# Patient Record
Sex: FEMALE | Race: BLACK OR AFRICAN AMERICAN | NOT HISPANIC OR LATINO | Employment: FULL TIME | URBAN - METROPOLITAN AREA
[De-identification: names, ages, dates, MRNs, and addresses within clinical notes are randomized per-mention and may not be internally consistent; named-entity substitution may affect disease eponyms.]

---

## 2024-06-17 ENCOUNTER — OFFICE VISIT (OUTPATIENT)
Dept: URGENT CARE | Facility: CLINIC | Age: 19
End: 2024-06-17
Payer: COMMERCIAL

## 2024-06-17 VITALS
BODY MASS INDEX: 25.16 KG/M2 | TEMPERATURE: 98 F | RESPIRATION RATE: 18 BRPM | DIASTOLIC BLOOD PRESSURE: 88 MMHG | WEIGHT: 151 LBS | HEART RATE: 85 BPM | OXYGEN SATURATION: 98 % | SYSTOLIC BLOOD PRESSURE: 125 MMHG | HEIGHT: 65 IN

## 2024-06-17 DIAGNOSIS — R50.9 FEVER, UNSPECIFIED FEVER CAUSE: ICD-10-CM

## 2024-06-17 DIAGNOSIS — R09.81 NASAL CONGESTION: ICD-10-CM

## 2024-06-17 DIAGNOSIS — B97.89 VIRAL RESPIRATORY ILLNESS: ICD-10-CM

## 2024-06-17 DIAGNOSIS — J06.9 UPPER RESPIRATORY INFECTION WITH COUGH AND CONGESTION: Primary | ICD-10-CM

## 2024-06-17 DIAGNOSIS — J98.8 VIRAL RESPIRATORY ILLNESS: ICD-10-CM

## 2024-06-17 LAB
CTP QC/QA: YES
CTP QC/QA: YES
POC MOLECULAR INFLUENZA A AGN: NEGATIVE
POC MOLECULAR INFLUENZA B AGN: NEGATIVE
SARS-COV-2 AG RESP QL IA.RAPID: NEGATIVE

## 2024-06-17 PROCEDURE — 87811 SARS-COV-2 COVID19 W/OPTIC: CPT | Mod: QW,S$GLB,, | Performed by: NURSE PRACTITIONER

## 2024-06-17 PROCEDURE — 87502 INFLUENZA DNA AMP PROBE: CPT | Mod: QW,,, | Performed by: NURSE PRACTITIONER

## 2024-06-17 PROCEDURE — 99213 OFFICE O/P EST LOW 20 MIN: CPT | Mod: S$GLB,,, | Performed by: NURSE PRACTITIONER

## 2024-06-17 RX ORDER — FLUTICASONE PROPIONATE 50 MCG
1 SPRAY, SUSPENSION (ML) NASAL DAILY
Qty: 9.9 ML | Refills: 0 | Status: SHIPPED | OUTPATIENT
Start: 2024-06-17

## 2024-06-17 RX ORDER — BROMPHENIRAMINE MALEATE, PSEUDOEPHEDRINE HYDROCHLORIDE, AND DEXTROMETHORPHAN HYDROBROMIDE 2; 30; 10 MG/5ML; MG/5ML; MG/5ML
10 SYRUP ORAL EVERY 4 HOURS PRN
Qty: 120 ML | Refills: 0 | Status: SHIPPED | OUTPATIENT
Start: 2024-06-17

## 2024-06-17 RX ORDER — AZELASTINE 1 MG/ML
1 SPRAY, METERED NASAL 2 TIMES DAILY
Qty: 30 ML | Refills: 0 | Status: SHIPPED | OUTPATIENT
Start: 2024-06-17 | End: 2025-06-17

## 2024-06-17 NOTE — PROGRESS NOTES
"Subjective:      Patient ID: Gunnar Onofre is a 19 y.o. female.    Vitals:  height is 5' 5" (1.651 m) and weight is 68.5 kg (151 lb). Her oral temperature is 97.7 °F (36.5 °C). Her blood pressure is 125/88 and her pulse is 85. Her respiration is 18 and oxygen saturation is 98%.     Chief Complaint: Nasal Congestion    Pt presents with congestion and cough x 3 days. States she had a fever yesterday while at work. Temp was 101. She has been taking mucinex and thera flu with no relief.    Other  This is a new problem. The current episode started in the past 7 days. The problem occurs constantly. The problem has been gradually worsening. Associated symptoms include chills, congestion, coughing, diaphoresis, fatigue, a fever, headaches, myalgias and a sore throat. Pertinent negatives include no abdominal pain, chest pain, nausea, neck pain, rash, vertigo or vomiting.       Constitution: Positive for chills, sweating, fatigue and fever. Negative for activity change and appetite change.   HENT:  Positive for congestion, nosebleeds, postnasal drip, sinus pressure and sore throat. Negative for ear pain.    Neck: Negative for neck pain, neck stiffness and painful lymph nodes.   Cardiovascular:  Negative for chest pain, leg swelling and palpitations.   Eyes:  Positive for eye itching.   Respiratory:  Positive for cough. Negative for sputum production, shortness of breath, wheezing and asthma.    Gastrointestinal:  Negative for abdominal pain, nausea, vomiting and diarrhea.   Musculoskeletal:  Positive for muscle ache.   Skin:  Negative for color change, pale, rash and wound.   Allergic/Immunologic: Negative for asthma.   Neurological:  Positive for headaches. Negative for dizziness, history of vertigo, light-headedness, disorientation and altered mental status.   Hematologic/Lymphatic: Negative for swollen lymph nodes.   Psychiatric/Behavioral:  Negative for altered mental status, disorientation and confusion.     "   Objective:     Physical Exam   Constitutional: She is oriented to person, place, and time. She appears well-developed. She is cooperative.  Non-toxic appearance. She does not appear ill. No distress.   HENT:   Head: Normocephalic and atraumatic.   Ears:   Right Ear: Hearing, external ear and ear canal normal. Tympanic membrane is not perforated, not erythematous, not retracted and not bulging. A middle ear effusion is present.   Left Ear: Hearing, external ear and ear canal normal. Tympanic membrane is not perforated, not erythematous, not retracted and not bulging. A middle ear effusion is present.   Nose: Mucosal edema and rhinorrhea present. No nasal deformity. No epistaxis. Right sinus exhibits no maxillary sinus tenderness and no frontal sinus tenderness. Left sinus exhibits no maxillary sinus tenderness and no frontal sinus tenderness.   Mouth/Throat: Uvula is midline and mucous membranes are normal. No trismus in the jaw. Normal dentition. No uvula swelling. Posterior oropharyngeal erythema and cobblestoning present. No oropharyngeal exudate or posterior oropharyngeal edema. No tonsillar exudate.   Eyes: Conjunctivae and lids are normal. No scleral icterus.   Neck: Trachea normal and phonation normal. Neck supple. No edema present. No erythema present. No neck rigidity present.   Cardiovascular: Normal rate, regular rhythm, normal heart sounds and normal pulses.   Pulmonary/Chest: Effort normal and breath sounds normal. No respiratory distress. She has no decreased breath sounds. She has no rhonchi.   Abdominal: Normal appearance.   Musculoskeletal: Normal range of motion.         General: No deformity. Normal range of motion.   Lymphadenopathy:     She has cervical adenopathy.        Left cervical: Superficial cervical adenopathy present.   Neurological: She is alert and oriented to person, place, and time. She exhibits normal muscle tone. Coordination normal.   Skin: Skin is warm, dry, intact, not  diaphoretic and not pale.   Psychiatric: Her speech is normal and behavior is normal. Judgment and thought content normal.   Nursing note and vitals reviewed.      Assessment:     1. Upper respiratory infection with cough and congestion    2. Nasal congestion    3. Fever, unspecified fever cause    4. Viral respiratory illness        Plan:     Office Visit on 06/17/2024   Component Date Value Ref Range Status    SARS Coronavirus 2 Antigen 06/17/2024 Negative  Negative Final     Acceptable 06/17/2024 Yes   Final    POC Molecular Influenza A Ag 06/17/2024 Negative  Negative Final    POC Molecular Influenza B Ag 06/17/2024 Negative  Negative Final     Acceptable 06/17/2024 Yes   Final       Upper respiratory infection with cough and congestion  -     brompheniramine-pseudoeph-DM (BROMFED DM) 2-30-10 mg/5 mL Syrp; Take 10 mLs by mouth every 4 (four) hours as needed (cough/congestion).  Dispense: 120 mL; Refill: 0  -     azelastine (ASTELIN) 137 mcg (0.1 %) nasal spray; 1 spray (137 mcg total) by Nasal route 2 (two) times daily.  Dispense: 30 mL; Refill: 0  -     fluticasone propionate (FLONASE) 50 mcg/actuation nasal spray; 1 spray (50 mcg total) by Each Nostril route once daily.  Dispense: 9.9 mL; Refill: 0    Nasal congestion  -     SARS Coronavirus 2 Antigen, POCT Manual Read  -     POCT Influenza A/B MOLECULAR    Fever, unspecified fever cause    Viral respiratory illness  -     brompheniramine-pseudoeph-DM (BROMFED DM) 2-30-10 mg/5 mL Syrp; Take 10 mLs by mouth every 4 (four) hours as needed (cough/congestion).  Dispense: 120 mL; Refill: 0  -     azelastine (ASTELIN) 137 mcg (0.1 %) nasal spray; 1 spray (137 mcg total) by Nasal route 2 (two) times daily.  Dispense: 30 mL; Refill: 0  -     fluticasone propionate (FLONASE) 50 mcg/actuation nasal spray; 1 spray (50 mcg total) by Each Nostril route once daily.  Dispense: 9.9 mL; Refill: 0                Patient Instructions   Please follow  up with your primary care provider within 2-5 days if your signs and symptoms have not resolved or worsen.     If your condition worsens or fails to improve we recommend that you receive another evaluation at the emergency room immediately or contact your primary medical clinic to discuss your concerns.   You must understand that you have received an Urgent Care treatment only and that you may be released before all of your medical problems are known or treated. You, the patient, will arrange for follow up care as instructed.       You have tested negative for COVID on our Binax test. As a follow up the recommendation is if you have symptoms within the first 7 days to retest within 48 hours. I you have NO SYMPTONS then you should test every 48 hours two more times.  Alternate Tylenol/Motrin as needed for fever.  PLAIN Zyrtec once every evening.  OTC Edin Med sinus rinses.  Apply thin layer of OTC Aquaphor to inside of nostrils.  Use saline nasal spray to keep nasal passages moist.  Pinch and hold firm, steady pressure to nose for nosebleeds. Use OTC Afrin nasal spray (2 squirts) for no longer than 2 days ONLY if needed for uncontrolled nosebleeds.    OTC throat pain mixture Instructions    Combine the following ingredients:    10 ml of Maalox (Aluminum-magnesium hydroxide-simethicone 200-200-20 mg/5 ml) PLUS    10 ml of Tylenol (Acetaminophen 160 mg/5 ml) PLUS     5 ml of Benadryl (Diphenhydramine 12.5 mg/5 ml)     Take mixture as a single dose; gargle then swallow every 6 hours as needed for throat pain relief.  Refrigerate mixture for optimal comfort/pain relief.

## 2024-06-17 NOTE — LETTER
June 17, 2024      [place] [facility]  [facility] [facility] [place] [address]  Phone: [phone]  Fax: [phone]       Patient: Gunnar Onofre   YOB: 2005  Date of Visit: 06/17/2024    To Whom It May Concern:    Joyce Onofre  was at [facility] on 06/17/2024. The patient may return to work/school on 6/19/2024 with no restrictions. If you have any questions or concerns, or if I can be of further assistance, please do not hesitate to contact me.    Sincerely,    Farhana Riley NP

## 2024-06-17 NOTE — LETTER
June 17, 2024      [place] [facility]  [facility] [facility] [place] [address]  Phone: [phone]  Fax: [phone]       Patient: Gunnar Onofre   YOB: 2005  Date of Visit: 06/17/2024    To Whom It May Concern:    Joyce Onofre  was at [facility] on 06/17/2024. The patient may return to work/school on 6/18/2024 with no restrictions. If you have any questions or concerns, or if I can be of further assistance, please do not hesitate to contact me.    Sincerely,    Farhana Riley NP

## 2024-06-17 NOTE — PATIENT INSTRUCTIONS
Please follow up with your primary care provider within 2-5 days if your signs and symptoms have not resolved or worsen.     If your condition worsens or fails to improve we recommend that you receive another evaluation at the emergency room immediately or contact your primary medical clinic to discuss your concerns.   You must understand that you have received an Urgent Care treatment only and that you may be released before all of your medical problems are known or treated. You, the patient, will arrange for follow up care as instructed.       You have tested negative for COVID on our Binax test. As a follow up the recommendation is if you have symptoms within the first 7 days to retest within 48 hours. I you have NO SYMPTONS then you should test every 48 hours two more times.  Alternate Tylenol/Motrin as needed for fever.  PLAIN Zyrtec once every evening.  OTC Edin Med sinus rinses.  Apply thin layer of OTC Aquaphor to inside of nostrils.  Use saline nasal spray to keep nasal passages moist.  Pinch and hold firm, steady pressure to nose for nosebleeds. Use OTC Afrin nasal spray (2 squirts) for no longer than 2 days ONLY if needed for uncontrolled nosebleeds.    OTC throat pain mixture Instructions    Combine the following ingredients:    10 ml of Maalox (Aluminum-magnesium hydroxide-simethicone 200-200-20 mg/5 ml) PLUS    10 ml of Tylenol (Acetaminophen 160 mg/5 ml) PLUS     5 ml of Benadryl (Diphenhydramine 12.5 mg/5 ml)     Take mixture as a single dose; gargle then swallow every 6 hours as needed for throat pain relief.  Refrigerate mixture for optimal comfort/pain relief.

## 2024-10-18 ENCOUNTER — OFFICE VISIT (OUTPATIENT)
Dept: URGENT CARE | Facility: CLINIC | Age: 19
End: 2024-10-18
Payer: COMMERCIAL

## 2024-10-18 VITALS
RESPIRATION RATE: 18 BRPM | OXYGEN SATURATION: 100 % | HEART RATE: 87 BPM | DIASTOLIC BLOOD PRESSURE: 79 MMHG | TEMPERATURE: 98 F | WEIGHT: 147 LBS | BODY MASS INDEX: 23.63 KG/M2 | HEIGHT: 66 IN | SYSTOLIC BLOOD PRESSURE: 122 MMHG

## 2024-10-18 DIAGNOSIS — R19.7 DIARRHEA, UNSPECIFIED TYPE: ICD-10-CM

## 2024-10-18 DIAGNOSIS — R51.9 SINUS HEADACHE: ICD-10-CM

## 2024-10-18 DIAGNOSIS — J32.9 SINUSITIS, UNSPECIFIED CHRONICITY, UNSPECIFIED LOCATION: Primary | ICD-10-CM

## 2024-10-18 PROCEDURE — 99214 OFFICE O/P EST MOD 30 MIN: CPT | Mod: ,,,

## 2024-10-18 PROCEDURE — 96372 THER/PROPH/DIAG INJ SC/IM: CPT | Mod: ,,,

## 2024-10-18 RX ORDER — DOXYCYCLINE 100 MG/1
100 CAPSULE ORAL EVERY 12 HOURS
Qty: 14 CAPSULE | Refills: 0 | Status: SHIPPED | OUTPATIENT
Start: 2024-10-18 | End: 2024-10-25

## 2024-10-18 RX ORDER — KETOROLAC TROMETHAMINE 30 MG/ML
15 INJECTION, SOLUTION INTRAMUSCULAR; INTRAVENOUS
Status: COMPLETED | OUTPATIENT
Start: 2024-10-18 | End: 2024-10-18

## 2024-10-18 RX ADMIN — KETOROLAC TROMETHAMINE 15 MG: 30 INJECTION, SOLUTION INTRAMUSCULAR; INTRAVENOUS at 04:10

## 2024-10-18 NOTE — LETTER
October 18, 2024      [facility] [place] [address]  [facility] [address]  Phone: [phone]       Patient: Gunnar Onofre   YOB: 2005  Date of Visit: 10/18/2024    To Whom It May Concern:    Joyce Onofre  was at [facility] on 10/18/2024. The patient may return to work/school on 10/20/2024 with no restrictions. If you have any questions or concerns, or if I can be of further assistance, please do not hesitate to contact me.    Sincerely,    Moustapha Sanchez LPN

## 2024-10-18 NOTE — PATIENT INSTRUCTIONS
Diarrhea   Once your return to the stool collection kit, we will call in 2-3 days with the results   Increase fluid intake and monitor for signs of dehydration including dark colored urine, weakness, lethargy, dizziness, etc.   Get plenty of rest.   BRAT Diet: Begin eating a BRAT diet as tolerated (bananas, plain rice, apple sauce, plain toast).  Fever / Body Aches: Take OTC Tylenol as needed for discomfort   Diarrhea: Take OTC Imodium per package instructions as needed for non-bloody diarrhea.   Follow-up with your Primary Care Provider as needed.   Present to the nearest Emergency Department with any significant change or worsening symptoms.    Sinusitis   Do not start the antibiotics until you return the stool culture   Do not take any other NSAIDS (motrin, advil, aleve, naproxen ect for the next 24 hours) you may take tylenol as needed   Start taking an allergy pill daily such as claritin, zyrtec, allegrea or xyzal. Also start using a nasal steroid spray such as flonase or nasacort daily. If you are not being treated for high blood pressure, you can also take decongestant such as sudafed as needed. They can be purchased over the counter. If oral steroids were prescribed, start them tomorrow morning. Monitor for fever. Take tylenol/acetaminophen or ibuprofen as needed. Rest and hydrate. If symptoms persist or worsen, return to clinic or seek medical attention immediately.

## 2024-10-18 NOTE — PROGRESS NOTES
"Subjective:      Patient ID: Gunnar Onofre is a 19 y.o. female.    Vitals:  height is 5' 6" (1.676 m) and weight is 66.7 kg (147 lb). Her oral temperature is 98.3 °F (36.8 °C). Her blood pressure is 122/79 and her pulse is 87. Her respiration is 18 and oxygen saturation is 100%.     Chief Complaint: Migraine    Patient is a 19 y.o. female who presents to urgent care with complaints of sinus congestion, sinus pain and headache x 1.5 weeks, worse over the last 2 days. She reports chills at night over the last 2-3 days. Alleviating factors include theraflu, tylenol with mild amount of relief. She denies any cough, sore throat, ear pain, fever, n/v, cp, sob, or vision changes.     She also c/o diarrhea X 5 days. She denies any recent travel or sick contacts, blood or mucous in her stool, abdominal pain, vomiting, body aches, for fever.     Headache   Associated symptoms include sinus pressure. Pertinent negatives include no coughing, dizziness, fever, nausea, sore throat, vomiting or weakness. There is no history of migraine headaches.   Diarrhea   Associated symptoms include chills and headaches. Pertinent negatives include no coughing, fever or vomiting.     Constitution: Positive for chills and fatigue. Negative for fever.   HENT:  Positive for congestion, postnasal drip, sinus pain and sinus pressure. Negative for sore throat, trouble swallowing and voice change.    Eyes: Negative.    Respiratory:  Negative for cough, shortness of breath, wheezing and asthma.    Gastrointestinal:  Positive for diarrhea. Negative for nausea, vomiting and bright red blood in stool.   Genitourinary: Negative.    Allergic/Immunologic: Negative for asthma.   Neurological:  Positive for headaches. Negative for dizziness, speech difficulty, history of migraines and loss of consciousness.      Objective:     Physical Exam   Constitutional: She is oriented to person, place, and time. She appears well-developed. She is cooperative.  " Non-toxic appearance. She does not appear ill. No distress.   HENT:   Head: Normocephalic and atraumatic.   Ears:   Right Ear: Hearing and external ear normal.   Left Ear: Hearing and external ear normal.   Nose: Congestion present. Right sinus exhibits frontal sinus tenderness. Left sinus exhibits frontal sinus tenderness.   Mouth/Throat: Uvula is midline, oropharynx is clear and moist and mucous membranes are normal. Mucous membranes are moist. No trismus in the jaw. No uvula swelling. Oropharynx is clear.   Eyes: Conjunctivae and lids are normal.   Neck: Trachea normal and phonation normal. Neck supple. No edema present. No erythema present. No neck rigidity present.   Cardiovascular: Normal rate.   Pulmonary/Chest: Effort normal and breath sounds normal. No stridor. No respiratory distress. She has no decreased breath sounds. She has no wheezes. She has no rhonchi. She has no rales.   Abdominal: Normal appearance.   Neurological: no focal deficit. She is alert and oriented to person, place, and time. She displays facial symmetry. She exhibits normal muscle tone. Gait normal. GCS eye subscore is 4. GCS verbal subscore is 5. GCS motor subscore is 6.   Skin: Skin is warm, dry, intact, not diaphoretic and no rash. Capillary refill takes less than 2 seconds.   Psychiatric: Her speech is normal and behavior is normal. Mood normal.   Nursing note and vitals reviewed.      Assessment:     1. Sinusitis, unspecified chronicity, unspecified location    2. Sinus headache    3. Diarrhea, unspecified type        Plan:       Sinusitis, unspecified chronicity, unspecified location    Sinus headache    Diarrhea, unspecified type  -     Clostridium Diff Toxin, A & B, EIA  -     Stool Exam-Ova,Cysts,Parasites  -     GIARDIA/CRYPTOSPORIDIUM ANTIGEN  -     Stool Culture  -     Occult Blood, Stool Screening (1 -3)    Other orders  -     ketorolac injection 15 mg  -     doxycycline (MONODOX) 100 MG capsule; Take 1 capsule (100 mg  total) by mouth every 12 (twelve) hours. for 7 days  Dispense: 14 capsule; Refill: 0    Diarrhea   Once your return to the stool collection kit, we will call in 2-3 days with the results   Increase fluid intake and monitor for signs of dehydration including dark colored urine, weakness, lethargy, dizziness, etc.   Get plenty of rest.   BRAT Diet: Begin eating a BRAT diet as tolerated (bananas, plain rice, apple sauce, plain toast).  Fever / Body Aches: Take OTC Tylenol as needed for discomfort   Diarrhea: Take OTC Imodium per package instructions as needed for non-bloody diarrhea.   Follow-up with your Primary Care Provider as needed.   Present to the nearest Emergency Department with any significant change or worsening symptoms.    Sinusitis   Do not start the antibiotics until you return the stool culture   Do not take any other NSAIDS (motrin, advil, aleve, naproxen ect for the next 24 hours) you may take tylenol as needed   Start taking an allergy pill daily such as claritin, zyrtec, allegrea or xyzal. Also start using a nasal steroid spray such as flonase or nasacort daily. If you are not being treated for high blood pressure, you can also take decongestant such as sudafed as needed. They can be purchased over the counter. If oral steroids were prescribed, start them tomorrow morning. Monitor for fever. Take tylenol/acetaminophen or ibuprofen as needed. Rest and hydrate. If symptoms persist or worsen, return to clinic or seek medical attention immediately.

## 2024-11-30 ENCOUNTER — OFFICE VISIT (OUTPATIENT)
Dept: URGENT CARE | Facility: CLINIC | Age: 19
End: 2024-11-30
Payer: COMMERCIAL

## 2024-11-30 VITALS
SYSTOLIC BLOOD PRESSURE: 136 MMHG | HEIGHT: 65 IN | OXYGEN SATURATION: 100 % | TEMPERATURE: 99 F | DIASTOLIC BLOOD PRESSURE: 84 MMHG | HEART RATE: 80 BPM | RESPIRATION RATE: 16 BRPM | WEIGHT: 149.94 LBS | BODY MASS INDEX: 24.98 KG/M2

## 2024-11-30 DIAGNOSIS — G43.809 OTHER MIGRAINE WITHOUT STATUS MIGRAINOSUS, NOT INTRACTABLE: Primary | ICD-10-CM

## 2024-11-30 PROCEDURE — 99214 OFFICE O/P EST MOD 30 MIN: CPT | Mod: PBBFAC | Performed by: FAMILY MEDICINE

## 2024-11-30 PROCEDURE — 99214 OFFICE O/P EST MOD 30 MIN: CPT | Mod: S$PBB,,, | Performed by: FAMILY MEDICINE

## 2024-11-30 RX ORDER — SUMATRIPTAN 50 MG/1
50 TABLET, FILM COATED ORAL 2 TIMES DAILY PRN
Qty: 12 TABLET | Refills: 1 | Status: SHIPPED | OUTPATIENT
Start: 2024-11-30 | End: 2024-12-30

## 2024-11-30 NOTE — LETTER
November 30, 2024      [facility]  [address] [facility] [address]  Phone: [phone]       Patient: Gunnar Onofre   YOB: 2005  Date of Visit: 11/30/2024    To Whom It May Concern:    Joyce Onofre  was at [facility] on 11/30/2024. The patient may return to work/school on DEC 1 2024 with no restrictions. If you have any questions or concerns, or if I can be of further assistance, please do not hesitate to contact me.    Sincerely,    MANJIT MALAVE MD

## 2024-12-01 NOTE — PROGRESS NOTES
"Subjective:       Patient ID: Gunnar Onofre is a 19 y.o. female.    Chief Complaint: Migraine (X 2days)      Migraine       19-year-old female with migraine for 2 days.  Past medical history of migraines.  She does not have any migraine medicine at this point.  Review of Systems   Neurological:         As above         Objective:       Vital Signs  Temp: 98.6 °F (37 °C)  Pulse: 80  Resp: 16  SpO2: 100 %  BP: 136/84  Patient Position: Sitting  Pain Score:   8  Pain Loc: Head  Height and Weight  Height: 5' 5" (165.1 cm)  Weight: 68 kg (149 lb 14.6 oz)  BSA (Calculated - sq m): 1.77 sq meters  BMI (Calculated): 24.9  Weight in (lb) to have BMI = 25: 149.9]  Physical Exam  Vitals reviewed.   Constitutional:       Appearance: Normal appearance.   HENT:      Head: Normocephalic and atraumatic.   Eyes:      Extraocular Movements: Extraocular movements intact.      Conjunctiva/sclera: Conjunctivae normal.      Pupils: Pupils are equal, round, and reactive to light.   Cardiovascular:      Rate and Rhythm: Normal rate and regular rhythm.      Heart sounds: Normal heart sounds.   Pulmonary:      Breath sounds: Normal breath sounds.   Skin:     General: Skin is warm and dry.   Neurological:      General: No focal deficit present.      Mental Status: She is alert.   Psychiatric:         Mood and Affect: Mood and affect normal.         Speech: Speech normal.         Behavior: Behavior normal. Behavior is cooperative.         Thought Content: Thought content does not include homicidal or suicidal ideation.         Assessment:       Problem List Items Addressed This Visit    None  Visit Diagnoses       Other migraine without status migrainosus, not intractable    -  Primary    Relevant Medications    sumatriptan (IMITREX) 50 MG tablet            Plan:   Encouraged resting in a cool, dark, quiet room  Encouraged ibuprofen/acetaminophen as needed  ER precautions  Follow-up with PCP     "

## 2025-01-10 ENCOUNTER — OFFICE VISIT (OUTPATIENT)
Dept: URGENT CARE | Facility: CLINIC | Age: 20
End: 2025-01-10
Payer: COMMERCIAL

## 2025-01-10 VITALS
SYSTOLIC BLOOD PRESSURE: 125 MMHG | WEIGHT: 150.63 LBS | BODY MASS INDEX: 25.09 KG/M2 | TEMPERATURE: 99 F | OXYGEN SATURATION: 100 % | HEIGHT: 65 IN | DIASTOLIC BLOOD PRESSURE: 88 MMHG | HEART RATE: 95 BPM | RESPIRATION RATE: 20 BRPM

## 2025-01-10 DIAGNOSIS — G44.89 OTHER HEADACHE SYNDROME: Primary | ICD-10-CM

## 2025-01-10 RX ORDER — BUTALBITAL, ACETAMINOPHEN AND CAFFEINE 50; 325; 40 MG/1; MG/1; MG/1
1 TABLET ORAL EVERY 4 HOURS PRN
Qty: 30 TABLET | Refills: 0 | Status: SHIPPED | OUTPATIENT
Start: 2025-01-10 | End: 2025-02-09 | Stop reason: CLARIF

## 2025-01-10 RX ORDER — SUMATRIPTAN SUCCINATE 50 MG/1
50 TABLET ORAL ONCE
Qty: 1 TABLET | Refills: 0 | Status: SHIPPED | OUTPATIENT
Start: 2025-01-10 | End: 2025-01-10 | Stop reason: CLARIF

## 2025-01-10 RX ORDER — SUMATRIPTAN SUCCINATE 50 MG/1
50 TABLET ORAL 2 TIMES DAILY
Qty: 30 TABLET | Refills: 0 | Status: SHIPPED | OUTPATIENT
Start: 2025-01-10

## 2025-01-11 NOTE — PROGRESS NOTES
"Subjective:      Patient ID: Gunnar Onofre is a 19 y.o. female.    Vitals:  height is 5' 5" (1.651 m) and weight is 68.3 kg (150 lb 9.6 oz). Her oral temperature is 98.5 °F (36.9 °C). Her blood pressure is 125/88 and her pulse is 95. Her respiration is 20 and oxygen saturation is 100%.     Chief Complaint: Headache     Patient is a 19 y.o. female who presents to urgent care with complaints of headache x1 days. Alleviating factors include ibuprofen with mild amount of relief. Patient denies nausea, vomiting, fever. Pt has a history of chronic migraines.  Out of migraine medication, not sure of the name will see PCP on Monday        Neurological:  Positive for headaches and history of migraines. Negative for dizziness, light-headedness, passing out, facial drooping, speech difficulty, coordination disturbances, loss of balance, disorientation, altered mental status, loss of consciousness, numbness, tingling, seizures and tremors.   Psychiatric/Behavioral:  Negative for altered mental status and disorientation.       Objective:     Physical Exam   Constitutional: She is oriented to person, place, and time. She appears well-developed. She is cooperative.  Non-toxic appearance. She does not appear ill. No distress.   HENT:   Head: Normocephalic and atraumatic.   Ears:   Right Ear: Hearing, tympanic membrane, external ear and ear canal normal.   Left Ear: Hearing, tympanic membrane, external ear and ear canal normal.   Nose: Nose normal. No mucosal edema, rhinorrhea or nasal deformity. No epistaxis. Right sinus exhibits no maxillary sinus tenderness and no frontal sinus tenderness. Left sinus exhibits no maxillary sinus tenderness and no frontal sinus tenderness.   Mouth/Throat: Uvula is midline, oropharynx is clear and moist and mucous membranes are normal. No trismus in the jaw. Normal dentition. No uvula swelling. No oropharyngeal exudate, posterior oropharyngeal edema or posterior oropharyngeal erythema.   Eyes: " Conjunctivae and lids are normal. No scleral icterus.   Neck: Trachea normal and phonation normal. Neck supple. No edema present. No erythema present. No neck rigidity present.   Cardiovascular: Normal rate, regular rhythm, normal heart sounds and normal pulses.   Pulmonary/Chest: Effort normal and breath sounds normal. No respiratory distress. She has no decreased breath sounds. She has no rhonchi.   Abdominal: Normal appearance.   Musculoskeletal: Normal range of motion.         General: No deformity. Normal range of motion.   Neurological: no focal deficit. She is alert and oriented to person, place, and time. She exhibits normal muscle tone. Coordination normal.   Skin: Skin is warm, dry, intact, not diaphoretic and not pale.   Psychiatric: Her speech is normal and behavior is normal. Judgment and thought content normal.   Nursing note and vitals reviewed.    Previous History:     Review of patient's allergies indicates:  No Known Allergies    Past Medical History:   Diagnosis Date    Alopecia     Depression     Eczema     Migraines      Current Outpatient Medications   Medication Instructions    azelastine (ASTELIN) 137 mcg, Nasal, 2 times daily    brompheniramine-pseudoeph-DM (BROMFED DM) 2-30-10 mg/5 mL Syrp 10 mLs, Oral, Every 4 hours PRN    fluticasone propionate (FLONASE) 50 mcg, Each Nostril, Daily    sumatriptan (IMITREX) 50 mg, Oral, 2 times daily     Past Surgical History:   Procedure Laterality Date    NO PAST SURGERIES       Family History   Problem Relation Name Age of Onset    Brain cancer Mother      No Known Problems Father         Social History     Tobacco Use    Smoking status: Never    Smokeless tobacco: Never   Substance Use Topics    Alcohol use: Never    Drug use: Yes     Frequency: 7.0 times per week     Types: Marijuana     Assessment:     1. Other headache syndrome        Plan:   Take ibuprofen OTC for pain as directed  Take prescription Imitrex as directed for migraine  headaches  Follow-up with your primary care on Monday as scheduled and planned  Return here for concerns  Go to ER for worsening headache, vomiting, disorientation or for concerns.  Patient was unaware of the name of her migraine medication, attempts to contact her mother unsuccessful as stated by patient,  researched in previous medical records Imitrex 50 mg b.i.d. prn headache  Other headache syndrome  -     Discontinue: butalbital-acetaminophen-caffeine -40 mg (FIORICET, ESGIC) -40 mg per tablet; Take 1 tablet by mouth every 4 (four) hours as needed for Pain. (Patient not taking: Reported on 1/10/2025)  Dispense: 30 tablet; Refill: 0  -     Discontinue: sumatriptan (IMITREX) 50 MG tablet; Take 1 tablet (50 mg total) by mouth once. for 1 dose  Dispense: 1 tablet; Refill: 0  -     sumatriptan (IMITREX) 50 MG tablet; Take 1 tablet (50 mg total) by mouth 2 (two) times a day.  Dispense: 30 tablet; Refill: 0

## 2025-01-11 NOTE — PATIENT INSTRUCTIONS
Take ibuprofen OTC for pain as directed  Take prescription Imitrex as directed for migraine headaches  Follow-up with your primary care on Monday as scheduled and planned  Return here for concerns  Go to ER for worsening headache, vomiting, disorientation or for concerns.

## 2025-04-26 ENCOUNTER — OFFICE VISIT (OUTPATIENT)
Dept: URGENT CARE | Facility: CLINIC | Age: 20
End: 2025-04-26
Payer: COMMERCIAL

## 2025-04-26 VITALS
BODY MASS INDEX: 25.23 KG/M2 | DIASTOLIC BLOOD PRESSURE: 65 MMHG | HEIGHT: 66 IN | OXYGEN SATURATION: 100 % | HEART RATE: 79 BPM | SYSTOLIC BLOOD PRESSURE: 103 MMHG | RESPIRATION RATE: 18 BRPM | TEMPERATURE: 98 F | WEIGHT: 157 LBS

## 2025-04-26 DIAGNOSIS — J02.9 PHARYNGITIS, UNSPECIFIED ETIOLOGY: Primary | ICD-10-CM

## 2025-04-26 LAB
CTP QC/QA: YES
CTP QC/QA: YES
HETEROPH AB SER QL: NEGATIVE
MOLECULAR STREP A: NEGATIVE

## 2025-04-26 PROCEDURE — 99213 OFFICE O/P EST LOW 20 MIN: CPT | Mod: ,,,

## 2025-04-26 PROCEDURE — 86308 HETEROPHILE ANTIBODY SCREEN: CPT | Mod: QW,,,

## 2025-04-26 PROCEDURE — 87651 STREP A DNA AMP PROBE: CPT | Mod: QW,,,

## 2025-04-26 RX ORDER — PREDNISONE 20 MG/1
TABLET ORAL
Qty: 8 TABLET | Refills: 0 | Status: SHIPPED | OUTPATIENT
Start: 2025-04-26

## 2025-04-26 NOTE — PATIENT INSTRUCTIONS
Negative strep and mono  Drink plenty of fluids. Get plenty of rest.   Claritin, Zyrtec, or other over-the-counter antihistamine for runny nose, postnasal drip, nasal congestion.  Nasal spray such as Nasacort or Flonase for congestion.  Over-the-counter cough medication as needed and as directed.  Over-the-counter decongestants such as Sudafed, phenylephrine or pseudoephedrine.  Avoid these if you have a history of high blood pressure.  Warm saltwater gargles for sore throat.  Warm water with honey to help coat the throat.  Throat lozenges.  Chloraseptic spray for worsening sore throat.  Tylenol or ibuprofen as needed for sore throat and fever.  May alternate every 3 hours    Call or return to clinic as needed   Go to the ER with any significant change or worsening of symptoms.   Follow up with your primary care doctor.

## 2025-04-26 NOTE — PROGRESS NOTES
"Subjective:      Patient ID: Gunnar Onofre is a 20 y.o. female.    Vitals:  height is 5' 5.5" (1.664 m) and weight is 71.2 kg (157 lb). Her temperature is 98.3 °F (36.8 °C). Her blood pressure is 103/65 and her pulse is 79. Her respiration is 18 and oxygen saturation is 100%.     Chief Complaint: Sore Throat     Patient is a 20 y.o. female who presents to urgent care with complaints of sore throat, pain with swallowing present for 1 week now.  Was seen in another clinic in [place] with negative strep swab at that time.  Was told take over-the-counter medication with no relief.  Alleviating factors include theraflu with mild amount of relief.  Denies any significant cough, fever, body aches, chills, neck stiffness, rash, GI symptoms.  Tolerating secretions while in clinic.        ROS   Objective:     Physical Exam   Constitutional: She is oriented to person, place, and time. She appears well-developed. She is cooperative.  Non-toxic appearance. She does not appear ill. No distress.   HENT:   Head: Normocephalic and atraumatic.   Ears:   Right Ear: Hearing, tympanic membrane, external ear and ear canal normal.   Left Ear: Hearing, tympanic membrane, external ear and ear canal normal.   Nose: Nose normal. No mucosal edema, rhinorrhea or nasal deformity. No epistaxis. Right sinus exhibits no maxillary sinus tenderness and no frontal sinus tenderness. Left sinus exhibits no maxillary sinus tenderness and no frontal sinus tenderness.      Comments: Clear postnasal drip, cobblestoning  Mouth/Throat: Uvula is midline, oropharynx is clear and moist and mucous membranes are normal. Mucous membranes are moist. No trismus in the jaw. Normal dentition. No uvula swelling. No oropharyngeal exudate, posterior oropharyngeal edema or posterior oropharyngeal erythema.   Eyes: Conjunctivae and lids are normal. No scleral icterus.   Neck: Trachea normal and phonation normal. Neck supple. No edema present. No erythema present. No " neck rigidity present.   Cardiovascular: Normal rate, regular rhythm, normal heart sounds and normal pulses.   Pulmonary/Chest: Effort normal and breath sounds normal. No respiratory distress. She has no decreased breath sounds. She has no rhonchi.   Abdominal: Normal appearance.   Musculoskeletal: Normal range of motion.         General: No deformity. Normal range of motion.   Neurological: She is alert and oriented to person, place, and time. She exhibits normal muscle tone. Coordination normal.   Skin: Skin is warm, dry, intact, not diaphoretic and not pale.   Psychiatric: Her speech is normal and behavior is normal. Judgment and thought content normal.   Nursing note and vitals reviewed.      Assessment:     1. Pharyngitis, unspecified etiology        Plan:       Pharyngitis, unspecified etiology  -     POCT Strep A, Molecular  -     POCT Infectious mononucleosis antibody  -     predniSONE (DELTASONE) 20 MG tablet; One tablet orally twice daily for 3 days and then once daily for 2 days  Dispense: 8 tablet; Refill: 0      Negative strep and mono  Drink plenty of fluids. Get plenty of rest.   Claritin, Zyrtec, or other over-the-counter antihistamine for runny nose, postnasal drip, nasal congestion.  Nasal spray such as Nasacort or Flonase for congestion.  Over-the-counter cough medication as needed and as directed.  Over-the-counter decongestants such as Sudafed, phenylephrine or pseudoephedrine.  Avoid these if you have a history of high blood pressure.  Warm saltwater gargles for sore throat.  Warm water with honey to help coat the throat.  Throat lozenges.  Chloraseptic spray for worsening sore throat.  Tylenol or ibuprofen as needed for sore throat and fever.  May alternate every 3 hours    Call or return to clinic as needed   Go to the ER with any significant change or worsening of symptoms.   Follow up with your primary care doctor.

## 2025-04-26 NOTE — LETTER
April 26, 2025      [facility]  [address]  [place] [address]  Phone: [phone]  Fax: [phone]       Patient: Gunnar Onofre   YOB: 2005  Date of Visit: 04/26/2025    To Whom It May Concern:    Joyce Onfore  was at [facility] on 04/26/2025. She may return to work/school on 04/27/2025 with no restrictions. If you have any questions or concerns, or if I can be of further assistance, please do not hesitate to contact me.    Sincerely,    Mercedes Garg LPN

## 2025-07-03 ENCOUNTER — OFFICE VISIT (OUTPATIENT)
Dept: FAMILY MEDICINE | Facility: CLINIC | Age: 20
End: 2025-07-03
Payer: COMMERCIAL

## 2025-07-03 ENCOUNTER — TELEPHONE (OUTPATIENT)
Dept: FAMILY MEDICINE | Facility: CLINIC | Age: 20
End: 2025-07-03

## 2025-07-03 VITALS
WEIGHT: 153.81 LBS | OXYGEN SATURATION: 98 % | DIASTOLIC BLOOD PRESSURE: 67 MMHG | HEIGHT: 66 IN | RESPIRATION RATE: 18 BRPM | SYSTOLIC BLOOD PRESSURE: 102 MMHG | BODY MASS INDEX: 24.72 KG/M2 | HEART RATE: 103 BPM | TEMPERATURE: 98 F

## 2025-07-03 DIAGNOSIS — Z23 NEED FOR TDAP VACCINATION: ICD-10-CM

## 2025-07-03 DIAGNOSIS — Z11.4 ENCOUNTER FOR SCREENING FOR HIV: ICD-10-CM

## 2025-07-03 DIAGNOSIS — G43.009 MIGRAINE WITHOUT AURA AND WITHOUT STATUS MIGRAINOSUS, NOT INTRACTABLE: ICD-10-CM

## 2025-07-03 DIAGNOSIS — L30.9 ECZEMA, UNSPECIFIED TYPE: ICD-10-CM

## 2025-07-03 DIAGNOSIS — F32.A DEPRESSION, UNSPECIFIED DEPRESSION TYPE: ICD-10-CM

## 2025-07-03 DIAGNOSIS — Z13.1 DIABETES MELLITUS SCREENING: ICD-10-CM

## 2025-07-03 DIAGNOSIS — Z13.220 NEED FOR LIPID SCREENING: ICD-10-CM

## 2025-07-03 DIAGNOSIS — Z00.00 ANNUAL PHYSICAL EXAM: Primary | ICD-10-CM

## 2025-07-03 DIAGNOSIS — Z11.59 ENCOUNTER FOR HEPATITIS C SCREENING TEST FOR LOW RISK PATIENT: ICD-10-CM

## 2025-07-03 DIAGNOSIS — Z12.4 SCREENING FOR MALIGNANT NEOPLASM OF CERVIX: ICD-10-CM

## 2025-07-03 RX ORDER — BETAMETHASONE DIPROPIONATE 0.5 MG/G
CREAM TOPICAL 2 TIMES DAILY
Qty: 45 G | Refills: 0 | Status: SHIPPED | OUTPATIENT
Start: 2025-07-03

## 2025-07-03 RX ORDER — SUMATRIPTAN SUCCINATE 50 MG/1
50 TABLET ORAL ONCE
Qty: 30 TABLET | Refills: 0 | Status: SHIPPED | OUTPATIENT
Start: 2025-07-03 | End: 2025-07-05

## 2025-07-03 NOTE — TELEPHONE ENCOUNTER
Patient discussed referral to GYN discussed during visit. Stated she called her GYN to make appt and next available was not until september so she wanted to find somewhere that could get her in sooner

## 2025-07-03 NOTE — TELEPHONE ENCOUNTER
GYN referral faxed to [facility]  Psych referral faxed to Antonio Douglas NP  Derm referral faxed to Braxton Rojas MD

## 2025-07-03 NOTE — TELEPHONE ENCOUNTER
I have ordered the following. Please notify the patient.    Orders Placed This Encounter   Procedures    Ambulatory referral/consult to Obstetrics / Gynecology     Standing Status:   Future     Expected Date:   7/10/2025     Expiration Date:   8/3/2026     Referral Priority:   Routine     Referral Type:   Consultation     Referral Reason:   Specialty Services Required     Requested Specialty:   Obstetrics and Gynecology     Number of Visits Requested:   1

## 2025-07-03 NOTE — PROGRESS NOTES
"Subjective:      Patient ID: Gunnar Onofre is a 20 y.o.  female.    Chief Complaint: Establish Care/Wellness    Preventative Health: Patient amenable to Tdap vaccine. Patient amenable to labs. Patient will establish with OB-GYN for her well-woman exam.    Depression: Patient would like to be referred to Psychiatry.    Headaches: Patient taking Imitrex PRN.    Eczema: Located mainly to arms and stomach. She is using a moisturizer OTC. Patient would like to be referred to Dermatology.    FH: Mother had brain CA.    Review of Systems   Constitutional:  Negative for activity change, appetite change, chills, diaphoresis, fatigue, fever and unexpected weight change.   Eyes:  Negative for visual disturbance.   Respiratory:  Negative for apnea, cough, shortness of breath, wheezing and stridor.    Cardiovascular:  Negative for chest pain, palpitations and leg swelling.   Gastrointestinal:  Negative for abdominal pain, blood in stool, constipation, diarrhea, nausea and vomiting.   Genitourinary:  Negative for dysuria and hematuria.   Musculoskeletal:  Negative for arthralgias, back pain and myalgias.   Skin:  Negative for rash and wound.        +eczema   Neurological:  Positive for headaches. Negative for dizziness, syncope, weakness and numbness.   Psychiatric/Behavioral:  Positive for dysphoric mood. Negative for agitation, behavioral problems, confusion, decreased concentration, hallucinations, self-injury, sleep disturbance and suicidal ideas. The patient is not nervous/anxious and is not hyperactive.      Objective:   /67 (BP Location: Right arm, Patient Position: Sitting)   Pulse 103   Temp 98.4 °F (36.9 °C) (Oral)   Resp 18   Ht 5' 5.5" (1.664 m)   Wt 69.8 kg (153 lb 12.8 oz)   LMP  (LMP Unknown)   SpO2 98%   BMI 25.20 kg/m²     Physical Exam  Vitals and nursing note reviewed.   Constitutional:       General: She is not in acute distress.     Appearance: Normal appearance. She is not " ill-appearing or toxic-appearing.   HENT:      Head: Normocephalic and atraumatic.      Mouth/Throat:      Mouth: Mucous membranes are moist.      Pharynx: Oropharynx is clear.   Eyes:      Conjunctiva/sclera: Conjunctivae normal.   Cardiovascular:      Rate and Rhythm: Normal rate and regular rhythm.      Heart sounds: Normal heart sounds. No murmur heard.  Pulmonary:      Effort: Pulmonary effort is normal. No respiratory distress.      Breath sounds: Normal breath sounds.   Abdominal:      General: There is no distension.      Palpations: Abdomen is soft.      Tenderness: There is no abdominal tenderness.   Musculoskeletal:         General: No deformity.      Cervical back: Neck supple. No tenderness.      Right lower leg: No edema.      Left lower leg: No edema.   Lymphadenopathy:      Cervical: No cervical adenopathy.   Skin:     General: Skin is warm and dry.      Findings: No lesion or rash.   Neurological:      General: No focal deficit present.      Mental Status: She is alert.      Motor: No weakness.      Coordination: Coordination normal.   Psychiatric:         Mood and Affect: Mood normal.         Behavior: Behavior normal.         Thought Content: Thought content normal.         Judgment: Judgment normal.       Assessment/Plan:   1. Annual physical exam  -     CBC Auto Differential; Future; Expected date: 07/03/2025  -     Comprehensive Metabolic Panel; Future; Expected date: 07/03/2025  -     Hemoglobin A1C; Future; Expected date: 07/03/2025  -     Hepatitis C Antibody; Future; Expected date: 07/03/2025  -     HIV 1/2 Ag/Ab (4th Gen); Future; Expected date: 07/03/2025  -     Lipid Panel; Future; Expected date: 07/03/2025  -     TSH; Future; Expected date: 07/03/2025  -     Ambulatory referral/consult to Dermatology; Future; Expected date: 07/10/2025  -     Tdap (BOOSTRIX) vaccine injection 0.5 mL    2. Need for Tdap vaccination  -     Tdap (BOOSTRIX) vaccine injection 0.5 mL    3. Need for lipid  screening  -     Lipid Panel; Future; Expected date: 07/03/2025    4. Diabetes mellitus screening  -     Hemoglobin A1C; Future; Expected date: 07/03/2025    5. Encounter for screening for HIV  -     HIV 1/2 Ag/Ab (4th Gen); Future; Expected date: 07/03/2025    6. Encounter for hepatitis C screening test for low risk patient  -     Hepatitis C Antibody; Future; Expected date: 07/03/2025    7. Depression, unspecified depression type  Assessment & Plan:  - Stable.  - Patient requesting Psychiatry referral.    Orders:  -     Ambulatory referral/consult to Psychiatry; Future; Expected date: 07/10/2025    8. Migraine without aura and without status migrainosus, not intractable  Assessment & Plan:  - Stable.  - Continue Imitrex PRN.    Orders:  -     sumatriptan (IMITREX) 50 MG tablet; Take 1 tablet (50 mg total) by mouth once. If symptoms persist or return, may repeat dose after >=2 hours. Maximum dose: 100mg/dose; 200mg per 24 hours. for 1 dose  Dispense: 30 tablet; Refill: 0    9. Eczema, unspecified type  Assessment & Plan:  - Stable.  - Continue moisturizing cream OTC.    Orders:  -     Ambulatory referral/consult to Dermatology; Future; Expected date: 07/10/2025  -     betamethasone dipropionate 0.05 % cream; Apply topically 2 (two) times daily.  Dispense: 45 g; Refill: 0       Follow up in about 1 year (around 7/3/2026) for Wellness.

## 2025-07-03 NOTE — TELEPHONE ENCOUNTER
Copied from CRM #[id number]. Topic: General Inquiry - Patient Advice  >> Jul 3, 2025  9:24 AM Nancy wrote:  Who Called: Gunnar Onofre    Caller is requesting assistance/information from provider's office.    Symptoms (please be specific): NA   How long has patient had these symptoms:  NA  List of preferred pharmacies on file (remove unneeded): [unfilled]  If different, enter pharmacy into here including location and phone number: NA      Preferred Method of Contact: Phone Call  Patient's Preferred Phone Number on File: [phone]   Best Call Back Number, if different:  Additional Information: medical advice, pt called to discuss why appt today 7/3/25 was changed to 1: 40PM with NP instead of with MD Jensen @ 1PM today, pt stated she does not want to be seen by NP, only MD, ABL 2x to confirm/discuss no answer, please advise, thanks

## 2025-07-05 ENCOUNTER — OFFICE VISIT (OUTPATIENT)
Dept: URGENT CARE | Facility: CLINIC | Age: 20
End: 2025-07-05
Payer: COMMERCIAL

## 2025-07-05 VITALS
TEMPERATURE: 98 F | OXYGEN SATURATION: 99 % | DIASTOLIC BLOOD PRESSURE: 85 MMHG | HEART RATE: 72 BPM | RESPIRATION RATE: 20 BRPM | WEIGHT: 153.63 LBS | SYSTOLIC BLOOD PRESSURE: 128 MMHG | BODY MASS INDEX: 25.6 KG/M2 | HEIGHT: 65 IN

## 2025-07-05 DIAGNOSIS — Z11.3 SCREENING FOR STDS (SEXUALLY TRANSMITTED DISEASES): ICD-10-CM

## 2025-07-05 DIAGNOSIS — R10.2 VAGINAL PAIN: Primary | ICD-10-CM

## 2025-07-05 LAB
BILIRUB UR QL STRIP: NEGATIVE
GLUCOSE UR QL STRIP: NEGATIVE
KETONES UR QL STRIP: POSITIVE
LEUKOCYTE ESTERASE UR QL STRIP: NEGATIVE
PH, POC UA: 5.5
POC BLOOD, URINE: NEGATIVE
POC NITRATES, URINE: NEGATIVE
PROT UR QL STRIP: NEGATIVE
SP GR UR STRIP: 1.03 (ref 1–1.03)
UROBILINOGEN UR STRIP-ACNC: 0.2 (ref 0.1–1.1)

## 2025-07-05 PROCEDURE — 81003 URINALYSIS AUTO W/O SCOPE: CPT | Mod: PBBFAC

## 2025-07-05 PROCEDURE — 99213 OFFICE O/P EST LOW 20 MIN: CPT | Mod: S$PBB,,,

## 2025-07-05 PROCEDURE — 99214 OFFICE O/P EST MOD 30 MIN: CPT | Mod: PBBFAC

## 2025-07-05 PROCEDURE — 87591 N.GONORRHOEAE DNA AMP PROB: CPT

## 2025-07-05 NOTE — PROGRESS NOTES
"Subjective:       Patient ID: Gunnar Onofre is a 20 y.o. female.    Vitals:  height is 5' 5" (1.651 m) and weight is 69.7 kg (153 lb 9.6 oz). Her oral temperature is 98.4 °F (36.9 °C). Her blood pressure is 128/85 and her pulse is 72. Her respiration is 20 and oxygen saturation is 99%.     Chief Complaint: Vaginal Pain (Pt co vaginal burning when urinating. X 2 weeks.Pt denies discharge and vaginal odor. Poss exposure to STD )    20 year old  female complaining of  symptoms x 2 weeks, reports recently discovered establish male partner infidelity behaviors. Patient's last menstrual period was 06/28/2025 (exact date).      Vaginal Pain  The patient's pertinent negatives include no vaginal discharge. Associated symptoms include dysuria. Pertinent negatives include no frequency or urgency.       Genitourinary:  Positive for dysuria and vaginal pain. Negative for frequency, urgency, vaginal discharge, vaginal odor and painful intercourse.   Allergic/Immunologic: Negative for itching.       Objective:      Physical Exam   Constitutional: She is oriented to person, place, and time. She appears well-developed. She is cooperative. She is easily aroused.  Non-toxic appearance. She does not appear ill. normal and well-groomedawake  HENT:   Head: Normocephalic and atraumatic.   Eyes: Conjunctivae and lids are normal.   Pulmonary/Chest: Effort normal.   Abdominal: Normal appearance. Soft. flat abdomen   Genitourinary:         Comments: deferred     Neurological: She is alert, oriented to person, place, and time and easily aroused. GCS eye subscore is 4. GCS verbal subscore is 5. GCS motor subscore is 6.   Skin: Skin is not diaphoretic.   Psychiatric: Her behavior is normal. Her affect is tearful.   Nursing note and vitals reviewed.        Assessment:       1. Vaginal pain    2. Screening for STDs (sexually transmitted diseases)          Plan:     No concerns for UTI, STD screening is pending, staff will " contact patient with any abnormal findings, encouraged to adhere to safe sexual practices.    Vaginal pain  -     POCT Urinalysis, Dipstick, Automated, W/O Scope  -     Sexually-Transmitted Infections (STIs) Increased Risk Panel    Screening for STDs (sexually transmitted diseases)           Results for orders placed or performed in visit on 07/05/25   POCT Urinalysis, Dipstick, Automated, W/O Scope    Collection Time: 07/05/25  4:09 PM   Result Value Ref Range    POC Blood, Urine Negative Negative    POC Bilirubin, Urine Negative Negative    POC Urobilinogen, Urine 0.2 0.1 - 1.1    POC Ketones, Urine Positive (A) Negative    POC Protein, Urine Negative Negative    POC Nitrates, Urine Negative Negative    POC Glucose, Urine Negative Negative    pH, UA 5.5     POC Specific Gravity, Urine 1.030 (A) 1.003 - 1.029    POC Leukocytes, Urine Negative Negative

## 2025-07-07 LAB
C TRACH RRNA UR QL NAA+PROBE: NOT DETECTED
N GONORRHOEA DNA UR QL NAA+PROBE: NOT DETECTED
T VAGINALIS RRNA UR QL NAA+PROBE: NOT DETECTED

## 2025-08-05 ENCOUNTER — OFFICE VISIT (OUTPATIENT)
Dept: URGENT CARE | Facility: CLINIC | Age: 20
End: 2025-08-05
Payer: COMMERCIAL

## 2025-08-05 VITALS
HEART RATE: 86 BPM | WEIGHT: 153 LBS | HEIGHT: 65 IN | SYSTOLIC BLOOD PRESSURE: 107 MMHG | BODY MASS INDEX: 25.49 KG/M2 | TEMPERATURE: 98 F | RESPIRATION RATE: 17 BRPM | DIASTOLIC BLOOD PRESSURE: 71 MMHG

## 2025-08-05 DIAGNOSIS — G43.809 OTHER MIGRAINE WITHOUT STATUS MIGRAINOSUS, NOT INTRACTABLE: Primary | ICD-10-CM

## 2025-08-05 PROCEDURE — 99213 OFFICE O/P EST LOW 20 MIN: CPT | Mod: ,,, | Performed by: FAMILY MEDICINE

## 2025-08-05 RX ORDER — ARIPIPRAZOLE 5 MG/1
5 TABLET ORAL NIGHTLY
COMMUNITY
Start: 2025-07-10

## 2025-08-05 RX ORDER — SUMATRIPTAN SUCCINATE 50 MG/1
TABLET ORAL
Qty: 30 TABLET | Refills: 0 | Status: SHIPPED | OUTPATIENT
Start: 2025-08-05

## 2025-08-05 NOTE — LETTER
August 5, 2025      [facility]  [address]  [place] [address]  Phone: [phone]  Fax: [phone]       Patient: Gunnar Onofre   YOB: 2005  Date of Visit: 08/05/2025    To Whom It May Concern:    Joyce Onofre  was at [facility] on 08/05/2025. She may return to work/school on 08/06/2025 with no restrictions. If you have any questions or concerns, or if I can be of further assistance, please do not hesitate to contact me.    Sincerely,    Mercedes Garg LPN

## 2025-08-05 NOTE — PATIENT INSTRUCTIONS
Medication sent to pharmacy take as prescribed  Rest in a quiet dark room until your symptoms are improved. Avoid straining your eyes by watching TV or looking at electronic devices.  Put a cold moist cloth or cold pack on the painful areas of your head for 10-20 minutes at a time, make sure to put a thin cloth in between the cold pack and your skin.  Do not ignore new symptoms that occur with a headache such as fever, weakness or numbness, visual changes or confusion.  If these develop please seek immediate medical attention.  Recommend to keep a headache diary so you can identify triggers of your headaches.  Get plenty of sleep and exercise, eat a healthy diet.  Call or seek immediate medical attention if you develop sudden numbness, paralysis, or weakness in your face arm or leg, sudden visual changes, trouble speaking, confusion or trouble understanding, sudden problems with walking or balance, a sudden severe headache that is different from your past headaches, you develop fever or stiff neck, or if you have any new or other worrisome symptoms that arise at home.

## 2025-08-05 NOTE — PROGRESS NOTES
"Subjective:      Patient ID: Gunnar Onofre is a 20 y.o. female.    Vitals:  height is 5' 5" (1.651 m) and weight is 69.4 kg (153 lb). Her temperature is 98.2 °F (36.8 °C). Her blood pressure is 107/71 and her pulse is 86. Her respiration is 17 and oxygen saturation is 100% (pended).     Chief Complaint: Migraine     Patient is a 20 y.o. female who presents to urgent care with complaints of migraine since last night with light sensitivy. Patient denies fever, sore throat, cough or congestion.  She was seen by her primary care 1 month ago and a refill for sumatriptan was given, however she was unaware that this refills was sent to the pharmacy and she never picked it up.     Migraine   Pertinent negatives include no dizziness, fever, numbness, seizures or weakness. Her past medical history is significant for migraine headaches.     Constitution: Negative for chills, sweating, fatigue and fever.   HENT: Negative.     Respiratory: Negative.     Musculoskeletal: Negative.    Neurological:  Positive for headaches and history of migraines. Negative for dizziness, history of vertigo, light-headedness, passing out, facial drooping, loss of consciousness, numbness, tingling and seizures.   Hematologic/Lymphatic: Negative.       Objective:     Physical Exam   Constitutional: She is oriented to person, place, and time. She appears well-developed.  Non-toxic appearance. She does not appear ill. No distress.   HENT:   Head: Normocephalic and atraumatic.   Ears:   Right Ear: Hearing, tympanic membrane, external ear and ear canal normal.   Left Ear: Hearing, tympanic membrane, external ear and ear canal normal.   Nose: Nose normal. No mucosal edema, rhinorrhea or nasal deformity. No epistaxis. Right sinus exhibits no maxillary sinus tenderness and no frontal sinus tenderness. Left sinus exhibits no maxillary sinus tenderness and no frontal sinus tenderness.   Mouth/Throat: Uvula is midline, oropharynx is clear and moist and mucous " membranes are normal. No trismus in the jaw. Normal dentition. No uvula swelling. No posterior oropharyngeal erythema.   Eyes: Conjunctivae, EOM and lids are normal. Pupils are equal, round, and reactive to light. No scleral icterus.   Neck: Trachea normal and phonation normal. Neck supple. No neck rigidity present.   Cardiovascular: Normal rate, regular rhythm, normal heart sounds and normal pulses.   Pulmonary/Chest: Effort normal and breath sounds normal. No respiratory distress.   Abdominal: Normal appearance and bowel sounds are normal. She exhibits no distension. Soft. There is no abdominal tenderness.   Musculoskeletal: Normal range of motion.         General: No deformity. Normal range of motion.   Neurological: no focal deficit. She is alert, oriented to person, place, and time and at baseline. She displays no weakness. No cranial nerve deficit. She exhibits normal muscle tone. Coordination and gait normal.   Skin: Skin is warm, dry, intact, not diaphoretic and not pale.   Psychiatric: Her speech is normal and behavior is normal. Judgment and thought content normal.   Nursing note and vitals reviewed.         Previous History      Review of patient's allergies indicates:  No Known Allergies    Past Medical History:   Diagnosis Date    Alopecia     Bipolar disorder, unspecified     Depression     Eczema     Migraines      Current Outpatient Medications   Medication Instructions    ARIPiprazole (ABILIFY) 5 mg, Nightly    betamethasone dipropionate 0.05 % cream Topical (Top), 2 times daily    sumatriptan (IMITREX) 50 MG tablet Take 1 tablet (50 mg total) by mouth once. If symptoms persist or return, may repeat dose after >=2 hours. Maximum dose: 100mg/dose; 200mg per 24 hours. for 1 dose     Past Surgical History:   Procedure Laterality Date    NO PAST SURGERIES       Family History   Problem Relation Name Age of Onset    Brain cancer Mother      No Known Problems Father         Social History[1]     Physical  "Exam      Vital Signs Reviewed   /71   Pulse 86   Temp 98.2 °F (36.8 °C)   Resp 17   Ht 5' 5" (1.651 m)   Wt 69.4 kg (153 lb)   LMP 08/03/2025 (Exact Date)   SpO2 (P) 100%   BMI 25.46 kg/m²        Procedures    Procedures     Labs     Results for orders placed or performed in visit on 07/05/25   POCT Urinalysis, Dipstick, Automated, W/O Scope    Collection Time: 07/05/25  4:09 PM   Result Value Ref Range    POC Blood, Urine Negative Negative    POC Bilirubin, Urine Negative Negative    POC Urobilinogen, Urine 0.2 0.1 - 1.1    POC Ketones, Urine Positive (A) Negative    POC Protein, Urine Negative Negative    POC Nitrates, Urine Negative Negative    POC Glucose, Urine Negative Negative    pH, UA 5.5     POC Specific Gravity, Urine 1.030 (A) 1.003 - 1.029    POC Leukocytes, Urine Negative Negative   Sexually-Transmitted Infections (STIs) Increased Risk Panel    Collection Time: 07/05/25  4:18 PM   Result Value Ref Range    Chlamydia trachomatis Amp DNA Not Detected Not Detected    N gonorrhoeae, amplified DNA Not Detected Not Detected    Trichomonas vaginalis DNA, Urine Not Detected Not Detected      Assessment:     1. Other migraine without status migrainosus, not intractable        Plan:   Medication sent to pharmacy take as prescribed  Rest in a quiet dark room until your symptoms are improved. Avoid straining your eyes by watching TV or looking at electronic devices.  Put a cold moist cloth or cold pack on the painful areas of your head for 10-20 minutes at a time, make sure to put a thin cloth in between the cold pack and your skin.  Do not ignore new symptoms that occur with a headache such as fever, weakness or numbness, visual changes or confusion.  If these develop please seek immediate medical attention.  Recommend to keep a headache diary so you can identify triggers of your headaches.  Get plenty of sleep and exercise, eat a healthy diet.  Call or seek immediate medical attention if you " develop sudden numbness, paralysis, or weakness in your face arm or leg, sudden visual changes, trouble speaking, confusion or trouble understanding, sudden problems with walking or balance, a sudden severe headache that is different from your past headaches, you develop fever or stiff neck, or if you have any new or other worrisome symptoms that arise at home.     Other migraine without status migrainosus, not intractable    Other orders  -     sumatriptan (IMITREX) 50 MG tablet; Take 1 tablet (50 mg total) by mouth once. If symptoms persist or return, may repeat dose after >=2 hours. Maximum dose: 100mg/dose; 200mg per 24 hours. for 1 dose  Dispense: 30 tablet; Refill: 0                         [1]   Social History  Tobacco Use    Smoking status: Never     Passive exposure: Never    Smokeless tobacco: Never   Substance Use Topics    Alcohol use: Not Currently    Drug use: Yes     Frequency: 7.0 times per week     Types: Marijuana     Comment: every other day